# Patient Record
Sex: FEMALE | Race: WHITE
[De-identification: names, ages, dates, MRNs, and addresses within clinical notes are randomized per-mention and may not be internally consistent; named-entity substitution may affect disease eponyms.]

---

## 2018-08-22 ENCOUNTER — HOSPITAL ENCOUNTER (EMERGENCY)
Dept: HOSPITAL 97 - ER | Age: 38
Discharge: HOME | End: 2018-08-22
Payer: SELF-PAY
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
## 2018-08-22 DIAGNOSIS — F17.210: ICD-10-CM

## 2018-08-22 DIAGNOSIS — F43.10: ICD-10-CM

## 2018-08-22 DIAGNOSIS — F32.9: ICD-10-CM

## 2018-08-22 DIAGNOSIS — S06.0X0A: Primary | ICD-10-CM

## 2018-08-22 DIAGNOSIS — Z88.0: ICD-10-CM
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
## 2018-08-22 DIAGNOSIS — Z21: ICD-10-CM
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
## 2018-08-22 PROCEDURE — 99284 EMERGENCY DEPT VISIT MOD MDM: CPT

## 2018-08-22 PROCEDURE — 70486 CT MAXILLOFACIAL W/O DYE: CPT

## 2018-08-22 PROCEDURE — 72125 CT NECK SPINE W/O DYE: CPT

## 2018-08-22 PROCEDURE — 76377 3D RENDER W/INTRP POSTPROCES: CPT

## 2018-08-22 PROCEDURE — 70450 CT HEAD/BRAIN W/O DYE: CPT
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
